# Patient Record
Sex: MALE | Race: WHITE
[De-identification: names, ages, dates, MRNs, and addresses within clinical notes are randomized per-mention and may not be internally consistent; named-entity substitution may affect disease eponyms.]

---

## 2021-11-18 ENCOUNTER — HOSPITAL ENCOUNTER (EMERGENCY)
Dept: HOSPITAL 97 - ER | Age: 56
Discharge: HOME | End: 2021-11-18
Payer: COMMERCIAL

## 2021-11-18 VITALS — TEMPERATURE: 98.9 F | DIASTOLIC BLOOD PRESSURE: 64 MMHG | SYSTOLIC BLOOD PRESSURE: 119 MMHG | OXYGEN SATURATION: 96 %

## 2021-11-18 DIAGNOSIS — U07.1: Primary | ICD-10-CM

## 2021-11-18 DIAGNOSIS — E11.9: ICD-10-CM

## 2021-11-18 DIAGNOSIS — Z79.4: ICD-10-CM

## 2021-11-18 DIAGNOSIS — J18.9: ICD-10-CM

## 2021-11-18 LAB
ALBUMIN SERPL BCP-MCNC: 3.2 G/DL (ref 3.4–5)
ALP SERPL-CCNC: 61 U/L (ref 45–117)
ALT SERPL W P-5'-P-CCNC: 51 U/L (ref 12–78)
AST SERPL W P-5'-P-CCNC: 43 U/L (ref 15–37)
BUN BLD-MCNC: 21 MG/DL (ref 7–18)
GLUCOSE SERPLBLD-MCNC: 331 MG/DL (ref 74–106)
HCT VFR BLD CALC: 40.6 % (ref 39.6–49)
INR BLD: 1.01
LYMPHOCYTES # SPEC AUTO: 0.4 K/UL (ref 0.7–4.9)
MAGNESIUM SERPL-MCNC: 2.4 MG/DL (ref 1.8–2.4)
NT-PROBNP SERPL-MCNC: 62 PG/ML (ref ?–125)
PMV BLD: 7.7 FL (ref 7.6–11.3)
POTASSIUM SERPL-SCNC: 5 MMOL/L (ref 3.5–5.1)
RBC # BLD: 4.79 M/UL (ref 4.33–5.43)
TROPONIN (EMERG DEPT USE ONLY): < 0.02 NG/ML (ref 0–0.04)

## 2021-11-18 PROCEDURE — 36415 COLL VENOUS BLD VENIPUNCTURE: CPT

## 2021-11-18 PROCEDURE — 96365 THER/PROPH/DIAG IV INF INIT: CPT

## 2021-11-18 PROCEDURE — 96366 THER/PROPH/DIAG IV INF ADDON: CPT

## 2021-11-18 PROCEDURE — 85025 COMPLETE CBC W/AUTO DIFF WBC: CPT

## 2021-11-18 PROCEDURE — 71045 X-RAY EXAM CHEST 1 VIEW: CPT

## 2021-11-18 PROCEDURE — 84484 ASSAY OF TROPONIN QUANT: CPT

## 2021-11-18 PROCEDURE — 85610 PROTHROMBIN TIME: CPT

## 2021-11-18 PROCEDURE — 80076 HEPATIC FUNCTION PANEL: CPT

## 2021-11-18 PROCEDURE — 96375 TX/PRO/DX INJ NEW DRUG ADDON: CPT

## 2021-11-18 PROCEDURE — 83880 ASSAY OF NATRIURETIC PEPTIDE: CPT

## 2021-11-18 PROCEDURE — 82947 ASSAY GLUCOSE BLOOD QUANT: CPT

## 2021-11-18 PROCEDURE — 80048 BASIC METABOLIC PNL TOTAL CA: CPT

## 2021-11-18 PROCEDURE — 99284 EMERGENCY DEPT VISIT MOD MDM: CPT

## 2021-11-18 PROCEDURE — 83735 ASSAY OF MAGNESIUM: CPT

## 2021-11-18 NOTE — EDPHYS
Physician Documentation                                                                           

 [facility]                                                                 

Name: Low Bella                                                                                 

Age: 56 yrs                                                                                       

Sex: Male                                                                                         

: 1965                                                                                   

MRN: [id number]                                                                                   

Arrival Date: 2021                                                                          

Time: 13:37                                                                                       

Account#: [id number]                                                                            

Bed 6                                                                                             

Private MD:                                                                                       

ED Physician Jefferson Teresa                                                                       

HPI:                                                                                              

                                                                                             

07:10 This 56 yrs old  Male presents to ER via Wheelchair with complaints of         kdr 

      confusion.                                                                                  

07:10 Patient's been feeling poorly for about the past 9 days. 11 days ago he received his    kdr 

      first Covid vaccine. He went to the emergency department in [place] and was             

      diagnosed at that time with Bell's palsy. He was put on steroids at that point. Since       

      then his blood glucose has been not as well controlled as normal. Lately he has been        

      more confused with decreased appetite. His significant other believes that he may be        

      down 15 to 20 pounds. He was seen by an immunologist prior to arrival here. That            

      physician believe that he had some crackles in his right lung base. His oxygen              

      saturation has been in the low 90s. Patient's otherwise in his normal state of health..     

      Severity of symptoms: At their worst the symptoms were mild moderate in the emergency       

      department the symptoms are unchanged. The patient has not experienced similar symptoms     

      in the past. The patient has been recently seen by a physician:.                            

                                                                                                  

Historical:                                                                                       

- Allergies:                                                                                      

                                                                                             

14:01 No Known Allergies;                                                                     iw  

- Home Meds:                                                                                      

14:01 Novolin R Sub-Q [Active];                                                               iw  

- PMHx:                                                                                           

14:01 Diabetes mellitus;                                                                      iw  

- PSHx:                                                                                           

14:01 None;                                                                                   iw  

                                                                                                  

- Immunization history:: Client reports receiving the 1st dose of the Covid vaccine.              

- Social history:: Smoking status: Patient/guardian denies using tobacco.                         

                                                                                                  

                                                                                                  

ROS:                                                                                              

                                                                                             

07:10 Constitutional: Negative for fever, chills, and weight loss.                            kdr 

      Eyes: Negative for injury, pain, redness, and discharge, ENT: Negative for injury,          

      pain, and discharge, Neck: Negative for injury, pain, and swelling, Cardiovascular:         

      Negative for chest pain, palpitations, and edema, Respiratory: Negative for shortness       

      of breath, cough, wheezing, and pleuritic chest pain, Abdomen/GI: Negative for              

      abdominal pain, nausea, vomiting, diarrhea, and constipation, Back: Negative for injury     

      and pain, : Negative for injury, bleeding, discharge, and swelling, MS/Extremity:         

      Negative for injury and deformity, Skin: Negative for injury, rash, and discoloration,      

      Psych: Negative for depression, anxiety, suicide ideation, homicidal ideation, and          

      hallucinations, Allergy/Immunology: Negative for hives, rash, and allergies,                

      Hematologic/Lymphatic: Negative for swollen nodes, abnormal bleeding, and unusual           

      bruising.                                                                                   

      Constitutional: Positive for body aches, chills, fatigue, fever, malaise, poor PO           

      intake, weight loss.                                                                        

      Neuro: Positive for weakness, Mild confusion mild confusion and malaise.                    

      Endocrine: Positive for His blood high 200s to mid 300s.                                    

                                                                                                  

Exam:                                                                                             

07:10 Constitutional:  This is a well developed, well nourished patient who is awake, alert,  kdr 

      and in very mild distress. Head/Face:  Normocephalic, atraumatic. Eyes:  Pupils equal       

      round and reactive to light, extra-ocular motions intact.  Lids and lashes normal.          

      Conjunctiva and sclera are non-icteric and not injected.  Cornea within normal limits.      

      Periorbital areas with no swelling, redness, or edema. Neck:  Trachea midline, no           

      thyromegaly or masses palpated, and no cervical lymphadenopathy.  Supple, full range of     

      motion without nuchal rigidity, or vertebral point tenderness.  No Meningismus.             

      Chest/axilla:  Normal chest wall appearance and motion.  Nontender with no deformity.       

      No lesions are appreciated. Cardiovascular:  Regular rate and rhythm with a normal S1       

      and S2.  No gallops, murmurs, or rubs.  Normal PMI, no JVD.  No pulse deficits.             

      Abdomen/GI:  Soft, non-tender, with normal bowel sounds.  No distension or tympany.  No     

      guarding or rebound.  No evidence of tenderness throughout. Back:  No spinal                

      tenderness.  No costovertebral tenderness.  Full range of motion. Skin:  Warm, dry with     

      normal turgor.  Normal color with no rashes, no lesions, and no evidence of cellulitis.     

      MS/ Extremity:  Pulses equal, no cyanosis.  Neurovascular intact.  Full, normal range       

      of motion. Neuro:  Awake and alert, GCS 15, oriented to person, place, time, and            

      situation.  Cranial nerves II-XII grossly intact.  Motor strength 5/5 in all                

      extremities.  Sensory grossly intact.  Cerebellar exam normal.  Normal gait. Psych:         

      Awake, alert, with orientation to person, place and time.  Behavior, mood, and affect       

      are within normal limits.                                                                   

07:10 Respiratory: the patient does not display signs of respiratory distress,  Respirations:     

      normal, Breath sounds: rales, that are mild, are located in both bases, Greater on the      

      greater on the right than the left.                                                         

                                                                                                  

Vital Signs:                                                                                      

                                                                                             

13:58  / 70; Pulse 92; Resp 18 S; Temp 98.6(TE); Pulse Ox 96% on R/A; Weight 74.84 kg;  iw  

      Height 5 ft. 10 in. (177.80 cm);                                                            

15:00  / 72; Pulse 88; Resp 16; Pulse Ox 95% ;                                          bp  

16:00  / 76; Pulse 87; Resp 16; Pulse Ox 95% ;                                          bp  

17:00  / 68; Pulse 88; Resp 17; Pulse Ox 95% ;                                          bp  

19:11  / 64; Pulse 94; Resp 16; Temp 98.9; Pulse Ox 96% ;                               bp  

13:58 Body Mass Index 23.67 (74.84 kg, 177.80 cm)                                             iw  

                                                                                                  

MDM:                                                                                              

18:45 Patient medically screened.                                                             kdr 

                                                                                             

07:10 Data reviewed: vital signs, nurses notes, lab test result(s), radiologic studies. ED    kdr 

      course: Patient improved patient proved with the interventions given. He still appeared     

      generally weak but was responding appropriately to questions and his vital signs had        

      remained stable and within normal limits since arrival. Unbeknownst to the patient and      

      his significant other he did test positive for Covid. Patient was able to take p.o. and     

      was otherwise stable I reviewed all laboratory results and x-ray results with the           

      patient and significant other. They were happy with the care provided and the plan for      

      discharge with follow-up.                                                                   

                                                                                                  

                                                                                             

14:41 Order name: Basic Metabolic Panel; Complete Time: 16:27                                 kdr 

                                                                                             

14:41 Order name: CBC with Diff; Complete Time: 16:27                                         kdr 

                                                                                             

14:41 Order name: LFT's; Complete Time: 16:27                                                 kdr 

                                                                                             

14:41 Order name: Magnesium; Complete Time: 16:27                                             kdr 

                                                                                             

14:41 Order name: NT PRO-BNP; Complete Time: 16:27                                            kdr 

                                                                                             

14:41 Order name: PT-INR; Complete Time: 16:27                                                kdr 

                                                                                             

14:41 Order name: Troponin (emerg Dept Use Only); Complete Time: 16:27                        kdr 

                                                                                             

14:41 Order name: XRAY Chest (1 view); Complete Time: 16:27                                   kdr 

                                                                                             

16:20 Order name: SARS-COV-2 RT PCR; Complete Time: 17:36                                     EDMS

                                                                                             

18:36 Order name: Glucose, Ancillary Testing                                                  EDMS

                                                                                             

14:41 Order name: Cardiac monitoring; Complete Time: 14:51                                    kdr 

                                                                                             

14:41 Order name: EKG - Nurse/Tech; Complete Time: 15:41                                      kdr 

                                                                                             

14:41 Order name: IV Saline Lock; Complete Time: 15:41                                        kdr 

                                                                                             

14:41 Order name: Labs collected and sent; Complete Time: 15:41                               kdr 

                                                                                             

14:41 Order name: O2 Per Protocol; Complete Time: 14:47                                       kdr 

                                                                                             

14:41 Order name: O2 Sat Monitoring; Complete Time: 14:47                                     kdr 

                                                                                             

17:57 Order name: FSBS; Complete Time: 18:44                                                  kdr 

                                                                                                  

Administered Medications:                                                                         

                                                                                             

16:45 Drug: Rocephin - (cefTRIAXone) 1 grams Route: IVPB; Infused Over: 30 mins; Site: right  bp  

      forearm;                                                                                    

19:14 Follow up: IV Status: Completed infusion; IV Intake: 50ml                               bp  

16:45 Drug: NS 0.9% 1000 ml Route: IV; Rate: 1 bolus; Site: right forearm;                    bp  

19:13 Follow up: IV Status: Completed infusion; IV Intake: 1000ml                             bp  

16:45 Drug: Insulin Regular Human 6 units {Co-Signature: [id number] (Noni Mandel RN).} Route: IVP;   bp  

      Site: right forearm;                                                                        

18:44 Follow up: Response: Blood sugar is lowered                                             bp  

                                                                                                  

                                                                                                  

Disposition Summary:                                                                              

21 18:45                                                                                    

Discharge Ordered                                                                                 

      Location: Home                                                                          kdr 

      Problem: new                                                                            kdr 

      Symptoms: have improved                                                                 kdr 

      Condition: Stable                                                                       kdr 

      Diagnosis                                                                                   

        - Weakness                                                                            kdr 

        - Pneumonia, unspecified organism                                                     kdr 

        - SARS-associated coronavirus as the cause of diseases classified elsewhere           kdr 

      Followup:                                                                               kdr 

        - With: Private Physician                                                                  

        - When: 2 - 3 days                                                                         

        - Reason: If symptoms return, Further diagnostic work-up, Recheck today's complaints,      

      Continuance of care, Re-evaluation by your physician                                        

      Discharge Instructions:                                                                     

        - Discharge Summary Sheet                                                             kdr 

        - Community-Acquired Pneumonia, Adult                                                 kdr 

        - Fatigue                                                                             kdr 

        - Weakness, Easy-to-Read                                                              kdr 

        - COVID-19                                                                            kdr 

      Forms:                                                                                      

        - Medication Reconciliation Form                                                      kdr 

        - Thank You Letter                                                                    kdr 

        - Antibiotic Education                                                                kdr 

      Prescriptions:                                                                              

        - Zofran 4 mg Oral Tablet                                                                  

            - take 1 tablet by ORAL route every 4-6 hours As needed; 12 tablet; Refills: 0,   kdr 

      Product Selection Permitted                                                                 

        - Cipro 500 mg Oral Tablet                                                                 

            - take 1 tablet by ORAL route every 12 hours for 7 days; 14 tablet; Refills: 0,   kdr 

      Product Selection Permitted                                                                 

Signatures:                                                                                       

Dispatcher [employer]                           EDMS                                                 

Jefferson Teresa MD MD   kdr                                                  

Lisbet Ahmadi, RN                     RN   iw                                                   

Darek Sprague, FNP-C                      FNP-Cla1                                                  

Dave Howard RN                      RN   bp                                                   

Noni Mandel RN                               [id number]                                                  

                                                                                                  

Corrections: (The following items were deleted from the chart)                                    

16:20 14:42 CORONAVIRUS+MR.LAB.BRZ ordered. EDMS                                              EDMS

                                                                                                  

**************************************************************************************************

## 2021-11-18 NOTE — RAD REPORT
EXAM DESCRIPTION:  RAD - Chest Single View - 11/18/2021 3:30 pm

 

CLINICAL HISTORY:  CONGESTION

 

COMPARISON:  No comparisons

 

FINDINGS:  Lines: None.

Lungs: Ill-defined opacities at the left lung base.

Pleural: No significant pleural effusions or pneumothorax.

Cardiac: The heart size is within normal limits.

Bones: No acute fractures.

Other:

 

IMPRESSION:  Ill-defined left basilar airspace disease could reflect pneumonia.

## 2021-11-18 NOTE — ER
Nurse's Notes                                                                                     

 [facility]                                                                 

Name: Low Bella                                                                                 

Age: 56 yrs                                                                                       

Sex: Male                                                                                         

: 1965                                                                                   

MRN: [id number]                                                                                   

Arrival Date: 2021                                                                          

Time: 13:37                                                                                       

Account#: [id number]                                                                            

Bed 6                                                                                             

Private MD:                                                                                       

Diagnosis: Weakness;Pneumonia, unspecified organism;SARS-associated coronavirus as the cause of   

  diseases classified elsewhere                                                                   

                                                                                                  

Presentation:                                                                                     

                                                                                             

13:58 Chief complaint: Spouse and/or significant other states: had COVID vaccine about 11     iw  

      days ago , two days later got chills, Headache , then got numbness in his face, was         

      seen at ER in [place] and told he had bells palsy, given prednisone prednisone .        

      Now he has not been able to eat, is confused, BS is out of control , was seen by            

      immunologist and told she heard crackles in right lung , SpO2 was low 90s. Coronavirus      

      screen: Client presents with at least one sign or symptom that may indicate                 

      coronavirus-19. Ebola Screen: Patient negative for fever greater than or equal to 101.5     

      degrees Fahrenheit, and additional compatible Ebola Virus Disease symptoms Patient          

      denies exposure to infectious person. Patient denies travel to an Ebola-affected area       

      in the 21 days before illness onset. No symptoms or risks identified at this time.          

      Initial Sepsis Screen: Does the patient meet any 2 criteria? No. Patient's initial          

      sepsis screen is negative. Does the patient have a suspected source of infection? No.       

      Patient's initial sepsis screen is negative. Risk Assessment: Do you want to hurt           

      yourself or someone else? Patient reports no desire to harm self or others. Onset of        

      symptoms was 2021.                                                             

13:58 Method Of Arrival: Wheelchair                                                           iw  

13:58 Acuity: JESUS 3                                                                           iw  

                                                                                                  

Triage Assessment:                                                                                

14:00 General: Appears in no apparent distress. comfortable, Behavior is cooperative,         bp  

      anxious. Pain: Denies pain. EENT: No deficits noted. Neuro: Level of Consciousness is       

      awake, alert, obeys commands, Oriented to Appropriate for age. Cardiovascular: No           

      deficits noted. Respiratory: No deficits noted. GI: No signs and/or symptoms were           

      reported involving the gastrointestinal system. : No signs and/or symptoms were           

      reported regarding the genitourinary system. Derm: No deficits noted. Musculoskeletal:      

      No deficits noted.                                                                          

                                                                                                  

Historical:                                                                                       

- Allergies:                                                                                      

14:01 No Known Allergies;                                                                     iw  

- Home Meds:                                                                                      

14:01 Novolin R Sub-Q [Active];                                                               iw  

- PMHx:                                                                                           

14:01 Diabetes mellitus;                                                                      iw  

- PSHx:                                                                                           

14:01 None;                                                                                   iw  

                                                                                                  

- Immunization history:: Client reports receiving the 1st dose of the Covid vaccine.              

- Social history:: Smoking status: Patient/guardian denies using tobacco.                         

                                                                                                  

                                                                                                  

Screenin:00 Abuse screen: Denies threats or abuse. Denies injuries from another. Nutritional        bp  

      screening: No deficits noted. Tuberculosis screening: No symptoms or risk factors           

      identified. Fall Risk None identified.                                                      

                                                                                                  

Assessment:                                                                                       

14:00 General: SEE TRIAGE NOTE.                                                               bp  

15:00 Reassessment: No changes from previously documented assessment. Patient and/or family   bp  

      updated on plan of care and expected duration. Pain level reassessed.                       

17:00 Reassessment: No changes from previously documented assessment. Patient and/or family   bp  

      updated on plan of care and expected duration. Pain level reassessed.                       

19:02 Reassessment: PT D/C HOME VIA W/C WITH FAMILY. DX WITH COVID PNEUMONIA.                 bp  

                                                                                                  

Vital Signs:                                                                                      

13:58  / 70; Pulse 92; Resp 18 S; Temp 98.6(TE); Pulse Ox 96% on R/A; Weight 74.84 kg;  iw  

      Height 5 ft. 10 in. (177.80 cm);                                                            

15:00  / 72; Pulse 88; Resp 16; Pulse Ox 95% ;                                          bp  

16:00  / 76; Pulse 87; Resp 16; Pulse Ox 95% ;                                          bp  

17:00  / 68; Pulse 88; Resp 17; Pulse Ox 95% ;                                          bp  

19:11  / 64; Pulse 94; Resp 16; Temp 98.9; Pulse Ox 96% ;                               bp  

13:58 Body Mass Index 23.67 (74.84 kg, 177.80 cm)                                             iw  

                                                                                                  

ED Course:                                                                                        

13:37 Patient arrived in ED.                                                                  am2 

14:00 Patient has correct armband on for positive identification. Bed in low position. Call   bp  

      light in reach. Side rails up X2. Adult w/ patient.                                         

14:01 Triage completed.                                                                       iw  

14:01 Arm band placed on.                                                                     iw  

14:09 Dave Howard, RN is Primary Nurse.                                                    bp  

14:30 Inserted saline lock: 20 gauge in right forearm, using aseptic technique. Blood         bp  

      collected.                                                                                  

14:39 Jefferson Teresa MD is Attending Physician.                                              kdr 

15:30 XRAY Chest (1 view) In Process Unspecified.                                             EDMS

19:11 No provider procedures requiring assistance completed. IV discontinued, intact,         bp  

      bleeding controlled, No redness/swelling at site. Pressure dressing applied.                

                                                                                                  

Administered Medications:                                                                         

16:45 Drug: Rocephin - (cefTRIAXone) 1 grams Route: IVPB; Infused Over: 30 mins; Site: right  bp  

      forearm;                                                                                    

19:14 Follow up: IV Status: Completed infusion; IV Intake: 50ml                               bp  

16:45 Drug: NS 0.9% 1000 ml Route: IV; Rate: 1 bolus; Site: right forearm;                    bp  

19:13 Follow up: IV Status: Completed infusion; IV Intake: 1000ml                             bp  

16:45 Drug: Insulin Regular Human 6 units {Co-Signature: [id number] (Noni Mandel RN).} Route: IVP;   bp  

      Site: right forearm;                                                                        

18:44 Follow up: Response: Blood sugar is lowered                                             bp  

                                                                                                  

                                                                                                  

Intake:                                                                                           

19:13 IV: 1000ml; Total: 1000ml.                                                              bp  

19:14 IV: 50ml; Total: 1050ml.                                                                bp  

                                                                                                  

Outcome:                                                                                          

18:45 Discharge ordered by MD.                                                                kdr 

19:11 Discharged to home via wheelchair, with family.                                         bp  

19:11 Condition: stable                                                                           

19:11 Discharge instructions given to patient, Instructed on discharge instructions, follow       

      up and referral plans. medication usage, Demonstrated understanding of instructions,        

      follow-up care, medications, Prescriptions given X 2.                                       

19:14 Patient left the ED.                                                                    bp  

                                                                                                  

Signatures:                                                                                       

Dispatcher [employer]                           EDMS                                                 

Jefferson Teresa MD MD   kdr                                                  

Lisbet Ahmadi, RN                     RN                                                      

Kadie Pride Brian, RENE                      RN   bp                                                   

Noni Mandel RN                               jl7                                                  

                                                                                                  

**************************************************************************************************